# Patient Record
Sex: MALE | Race: OTHER | HISPANIC OR LATINO
[De-identification: names, ages, dates, MRNs, and addresses within clinical notes are randomized per-mention and may not be internally consistent; named-entity substitution may affect disease eponyms.]

---

## 2024-09-27 ENCOUNTER — OFFICE VISIT (OUTPATIENT)
Dept: MEDICAL GROUP | Facility: CLINIC | Age: 34
End: 2024-09-27
Payer: COMMERCIAL

## 2024-09-27 VITALS
OXYGEN SATURATION: 94 % | DIASTOLIC BLOOD PRESSURE: 78 MMHG | TEMPERATURE: 97.5 F | HEIGHT: 69 IN | WEIGHT: 211.5 LBS | BODY MASS INDEX: 31.32 KG/M2 | HEART RATE: 61 BPM | SYSTOLIC BLOOD PRESSURE: 114 MMHG

## 2024-09-27 DIAGNOSIS — R19.7 DIARRHEA, UNSPECIFIED TYPE: ICD-10-CM

## 2024-09-27 DIAGNOSIS — R32 URINARY INCONTINENCE, UNSPECIFIED TYPE: ICD-10-CM

## 2024-09-27 DIAGNOSIS — Z76.89 ENCOUNTER TO ESTABLISH CARE: ICD-10-CM

## 2024-09-27 ASSESSMENT — PATIENT HEALTH QUESTIONNAIRE - PHQ9: CLINICAL INTERPRETATION OF PHQ2 SCORE: 0

## 2024-09-27 NOTE — PROGRESS NOTES
"    SUBJECTIVE:     CC:    Chief Complaint   Patient presents with    Establish Care     Certain foods cause diarrhea, gassy, 8-9 times a day has a bowel movement, frequent urination, ear feels clogged during cold, blood work       HISTORY OF THE PRESENT ILLNESS:   Patient is a 34 y.o. male. This pleasant patient is here today to establish care and discuss urinary incontinence and multiple episodes of diarrhea during throughout the day.       Past Medical History:  No past medical history on file.    Surgical History:  No past surgical history on file.    Family History:  No family history on file.    Social History:     Medications:  No current outpatient medications on file prior to visit.     No current facility-administered medications on file prior to visit.     Not on File      ROS:   Gen: no fevers/chills, no changes in weight  Eyes: no changes in vision  ENT: no changes in hearing  Pulm: no sob, no cough  CV: no chest pain, no palpitations  GI: no nausea/vomiting, no diarrhea  MSk: no myalgias  Skin: no rash  Neuro: no headaches, no numbness/tingling      OBJECTIVE:     Exam: /78   Pulse 61   Temp 36.4 °C (97.5 °F) (Temporal)   Ht 1.753 m (5' 9\")   Wt 95.9 kg (211 lb 8 oz)   SpO2 94%  Body mass index is 31.23 kg/m².    General: Normal appearing. No distress.  HEENT: Normocephalic. Atraumatic.  Pulmonary: Normal effort. No rales, ronchi, or wheezing.  Cardiovascular: Regular rate and rhythm without murmur. Carotid and radial pulses are intact and equal bilaterally.  Abdomen: Soft, nontender, nondistended. Normal bowel sounds.   Neurologic: Grossly nonfocal.  Skin: Warm and dry.  No obvious lesions.  Musculoskeletal: Normal gait. No extremity cyanosis, clubbing, or edema.  Psych: Normal mood and affect. Alert and oriented x3. Judgment and insight is normal.      ASSESSMENT & PLAN:   34 y.o. male with the following -    Problem List Items Addressed This Visit       Encounter to establish care    "   Physical exam was unremarkable.  - Discussed good dietary and lifestye habits including exercising and walking.   - Adequate hydration with water  - Eating healthy meals, decrease soda intake and sweets.  - Limiting fatty and greasy foods.  - Encourage to bake, broil, boil, grill, roast or microwave foods.      Diarrhea  Patient reports that for some months now, he has GI issues whereby he has 10-12 bowel movements daily. He recently stopped lactose intake and noticed that his bowel movement has decreased to 6-9 episodes daily. Patient has not yet eliminated gluten from his diet. He admits poor fluid intake but drinks lots of soda at work. Patient denies constipation, recent travel or antibiotic use. He states his stress level is very low.   - Recommend keeping food diary and symptoms log. Paying attention to foods including but not limited to gluten, fructose, alcohol and caffeine.  - Encourage adequate daily fluid intake for hydration   - Recommend OTC antidiarrheal  - Emphasized stress management   - Follow up in 4 weeks.  - At the next visit, consider labs celiac testing.        Urinary incontinence  Patient reports that for some months now, he noticed when he gets the urge to void, he is able to but unable to empty his bladder. Patient denies pain with voiding or known history of BPH.   - Will order renal US and post void bladder scan.  - Further management will be dependent of the results of the above orders.  - Follow up in 4 weeks.    Relevant Orders    US-RENAL    POCT BLADDER SCAN       Return in about 4 weeks (around 10/25/2024).    Facundo Gomez, PGY-3  [facility]